# Patient Record
Sex: FEMALE | Race: WHITE | HISPANIC OR LATINO | Employment: UNEMPLOYED | URBAN - METROPOLITAN AREA
[De-identification: names, ages, dates, MRNs, and addresses within clinical notes are randomized per-mention and may not be internally consistent; named-entity substitution may affect disease eponyms.]

---

## 2023-08-12 ENCOUNTER — HOSPITAL ENCOUNTER (EMERGENCY)
Facility: HOSPITAL | Age: 43
Discharge: HOME | End: 2023-08-12
Attending: EMERGENCY MEDICINE
Payer: COMMERCIAL

## 2023-08-12 VITALS
WEIGHT: 140 LBS | TEMPERATURE: 99 F | SYSTOLIC BLOOD PRESSURE: 215 MMHG | RESPIRATION RATE: 14 BRPM | OXYGEN SATURATION: 98 % | DIASTOLIC BLOOD PRESSURE: 105 MMHG | HEART RATE: 96 BPM

## 2023-08-12 DIAGNOSIS — R03.0 ELEVATED BLOOD PRESSURE READING: ICD-10-CM

## 2023-08-12 DIAGNOSIS — N92.0 MENORRHAGIA WITH REGULAR CYCLE: Primary | ICD-10-CM

## 2023-08-12 LAB
ALBUMIN SERPL BCP-MCNC: 4 G/DL (ref 3.5–5.2)
ALP SERPL-CCNC: 89 U/L (ref 55–135)
ALT SERPL W/O P-5'-P-CCNC: 26 U/L (ref 10–44)
ANION GAP SERPL CALC-SCNC: 11 MMOL/L (ref 8–16)
AST SERPL-CCNC: 18 U/L (ref 10–40)
B-HCG UR QL: NEGATIVE
BASOPHILS # BLD AUTO: 0.06 K/UL (ref 0–0.2)
BASOPHILS NFR BLD: 0.4 % (ref 0–1.9)
BILIRUB SERPL-MCNC: 0.4 MG/DL (ref 0.1–1)
BILIRUB UR QL STRIP: NEGATIVE
BUN SERPL-MCNC: 11 MG/DL (ref 6–20)
CALCIUM SERPL-MCNC: 9.1 MG/DL (ref 8.7–10.5)
CHLORIDE SERPL-SCNC: 104 MMOL/L (ref 95–110)
CLARITY UR: CLEAR
CO2 SERPL-SCNC: 24 MMOL/L (ref 23–29)
COLOR UR: YELLOW
CREAT SERPL-MCNC: 0.7 MG/DL (ref 0.5–1.4)
CTP QC/QA: YES
DIFFERENTIAL METHOD: ABNORMAL
EOSINOPHIL # BLD AUTO: 0.1 K/UL (ref 0–0.5)
EOSINOPHIL NFR BLD: 0.4 % (ref 0–8)
ERYTHROCYTE [DISTWIDTH] IN BLOOD BY AUTOMATED COUNT: 13.1 % (ref 11.5–14.5)
EST. GFR  (NO RACE VARIABLE): >60 ML/MIN/1.73 M^2
GLUCOSE SERPL-MCNC: 105 MG/DL (ref 70–110)
GLUCOSE UR QL STRIP: NEGATIVE
HCT VFR BLD AUTO: 37.6 % (ref 37–48.5)
HGB BLD-MCNC: 12.7 G/DL (ref 12–16)
HGB UR QL STRIP: ABNORMAL
IMM GRANULOCYTES # BLD AUTO: 0.1 K/UL (ref 0–0.04)
IMM GRANULOCYTES NFR BLD AUTO: 0.6 % (ref 0–0.5)
KETONES UR QL STRIP: NEGATIVE
LEUKOCYTE ESTERASE UR QL STRIP: NEGATIVE
LYMPHOCYTES # BLD AUTO: 3 K/UL (ref 1–4.8)
LYMPHOCYTES NFR BLD: 18.1 % (ref 18–48)
MCH RBC QN AUTO: 27.7 PG (ref 27–31)
MCHC RBC AUTO-ENTMCNC: 33.8 G/DL (ref 32–36)
MCV RBC AUTO: 82 FL (ref 82–98)
MICROSCOPIC COMMENT: ABNORMAL
MONOCYTES # BLD AUTO: 1 K/UL (ref 0.3–1)
MONOCYTES NFR BLD: 6.1 % (ref 4–15)
NEUTROPHILS # BLD AUTO: 12.5 K/UL (ref 1.8–7.7)
NEUTROPHILS NFR BLD: 74.4 % (ref 38–73)
NITRITE UR QL STRIP: NEGATIVE
NRBC BLD-RTO: 0 /100 WBC
PH UR STRIP: 7 [PH] (ref 5–8)
PLATELET # BLD AUTO: 419 K/UL (ref 150–450)
PMV BLD AUTO: 10.5 FL (ref 9.2–12.9)
POTASSIUM SERPL-SCNC: 3.5 MMOL/L (ref 3.5–5.1)
PROT SERPL-MCNC: 7.7 G/DL (ref 6–8.4)
PROT UR QL STRIP: NEGATIVE
RBC # BLD AUTO: 4.58 M/UL (ref 4–5.4)
RBC #/AREA URNS HPF: >100 /HPF (ref 0–4)
SODIUM SERPL-SCNC: 139 MMOL/L (ref 136–145)
SP GR UR STRIP: 1.01 (ref 1–1.03)
URN SPEC COLLECT METH UR: ABNORMAL
UROBILINOGEN UR STRIP-ACNC: NEGATIVE EU/DL
WBC # BLD AUTO: 16.76 K/UL (ref 3.9–12.7)

## 2023-08-12 PROCEDURE — 85025 COMPLETE CBC W/AUTO DIFF WBC: CPT | Performed by: NURSE PRACTITIONER

## 2023-08-12 PROCEDURE — 81000 URINALYSIS NONAUTO W/SCOPE: CPT | Performed by: EMERGENCY MEDICINE

## 2023-08-12 PROCEDURE — 81025 URINE PREGNANCY TEST: CPT | Performed by: EMERGENCY MEDICINE

## 2023-08-12 PROCEDURE — 99283 EMERGENCY DEPT VISIT LOW MDM: CPT

## 2023-08-12 PROCEDURE — 80053 COMPREHEN METABOLIC PANEL: CPT | Performed by: NURSE PRACTITIONER

## 2023-08-12 RX ORDER — KETOROLAC TROMETHAMINE 30 MG/ML
15 INJECTION, SOLUTION INTRAMUSCULAR; INTRAVENOUS
Status: DISCONTINUED | OUTPATIENT
Start: 2023-08-12 | End: 2023-08-12 | Stop reason: HOSPADM

## 2023-08-12 RX ORDER — NAPROXEN 500 MG/1
500 TABLET ORAL 2 TIMES DAILY WITH MEALS
Qty: 28 TABLET | Refills: 0 | Status: SHIPPED | OUTPATIENT
Start: 2023-08-12 | End: 2023-08-26

## 2023-08-12 NOTE — ED PROVIDER NOTES
Encounter Date: 8/12/2023       History     Chief Complaint   Patient presents with    Abdominal Cramping     Pt is complaining of abdominal cramping that may be caused by her menstrual. Pt is also hypertensive in triage.      43-year-old female presents for heavy vaginal bleeding.  Patient says this morning she was started a period was heavier than normal so she came to the ER to be evaluated.  Says that she has been passing some clots which is atypical for her.  Complains of lower abdominal pressure.  Patient says she does not have a history of high blood pressure, drank Coke and coffee just prior to arrival.  Denies visual disturbance, chest pain, shortness of breath, extremity numbness, tingling or weakness.    The history is provided by the patient. The history is limited by a language barrier. A  was used.     Review of patient's allergies indicates:  Not on File  No past medical history on file.  No past surgical history on file.  No family history on file.     Review of Systems    Physical Exam     Initial Vitals [08/12/23 1339]   BP Pulse Resp Temp SpO2   (!) 227/110 96 14 98.6 °F (37 °C) 98 %      MAP       --         Physical Exam    Nursing note and vitals reviewed.  Constitutional: She appears well-developed and well-nourished. She is not diaphoretic. No distress.   HENT:   Head: Normocephalic and atraumatic.   Eyes: EOM are normal. Pupils are equal, round, and reactive to light.   Neck: Neck supple.   Normal range of motion.  Cardiovascular:  Normal rate.           Pulmonary/Chest: No respiratory distress.   Abdominal: Abdomen is soft. She exhibits no distension. There is no abdominal tenderness.   Musculoskeletal:         General: Normal range of motion.      Cervical back: Normal range of motion and neck supple.     Neurological: She is alert and oriented to person, place, and time.   Skin: Skin is warm and dry.   Psychiatric: She has a normal mood and affect.         ED Course    Procedures  Labs Reviewed   URINALYSIS, REFLEX TO URINE CULTURE - Abnormal; Notable for the following components:       Result Value    Occult Blood UA 3+ (*)     All other components within normal limits    Narrative:     Specimen Source->Urine   CBC W/ AUTO DIFFERENTIAL - Abnormal; Notable for the following components:    WBC 16.76 (*)     Immature Granulocytes 0.6 (*)     Gran # (ANC) 12.5 (*)     Immature Grans (Abs) 0.10 (*)     Gran % 74.4 (*)     All other components within normal limits   URINALYSIS MICROSCOPIC - Abnormal; Notable for the following components:    RBC, UA >100 (*)     All other components within normal limits    Narrative:     Specimen Source->Urine   COMPREHENSIVE METABOLIC PANEL   POCT URINE PREGNANCY          Imaging Results    None          Medications   ketorolac injection 15 mg (has no administration in time range)     Medical Decision Making:   History:   Old Records Summarized: records from clinic visits.       <> Summary of Records: Seen 6/23/22 for COVID exposure   Differential Diagnosis:   Menorrhagia, fibroids, menopause, HTN, side effect of caffeine   Clinical Tests:   Lab Tests: Ordered and Reviewed  ED Management:  43-year-old female presents for evaluation of heavy vaginal bleeding after onset of her period this morning.  Upon arrival patient was noted to be hypertensive, however this is in the setting of caffeine intake just prior to arrival.  Patient denies a history of hypertension, no signs or symptoms to indicate end-organ damage including on labs that were drawn from triage.  Says that she feels anxious due to being in the ER, but understands that she should have this rechecked by her primary care doctor to ensure she does not need antihypertensive therapy. Hemoglobin stable.  No indication for further workup or imaging from the emergency department of her vaginal bleeding, likely 2/2 menopause, abnormal bleeding or fibroids. No pain to indicate ovarian torsion, just  complains of suprapubic abdominal pressure. Patient says that she already has an OB she would like to follow up with, so did not make a referral.  Also says that she already has a primary care doctor.  Given strict return precautions and outpatient follow up.    No acute emergent medical condition has been identified. The patient appears to be low risk for an emergent medical condition is appropriate for discharge with outpatient f/u as detailed in discharge instructions for reevaluation and possible continued outpatient workup and management. I have discussed the workup with the patient, who has verbalized understanding of the plan and need for outpatient follow-up.  This evaluation does not preclude the development of an emergent condition so in addition, I have advised the patient that they can return to the ED at any time with worsening or change of their symptoms, or with any other medical complaint.                ED Course as of 08/12/23 1625   Sat Aug 12, 2023   1523 Hemoglobin: 12.7  Stable  [AT]   1523 Creatinine: 0.7  Normal [AT]   1524 Protein, UA: Negative  Normal  [AT]   1524 RBC, UA(!): >100  C/w menstrual cycle  [AT]   1524 Sodium: 139  Normal  [AT]   1524 Preg Test, Ur: Negative  Normal  [AT]   1542 BP(!): 215/105  Asymptomatic HTN, no e/o end organ damage or hypertensive emergency, no indication for emergent reduction or admission, counseled on long term risks of uncontrolled hypertension including CVA/ACS/CHF/ESRD, given return precautions, rec pcp f/u to monitor   [AT]      ED Course User Index  [AT] Alma Bartlett MD                 Clinical Impression:   Final diagnoses:  [N92.0] Menorrhagia with regular cycle (Primary)  [R03.0] Elevated blood pressure reading        ED Disposition Condition    Discharge Stable          ED Prescriptions       Medication Sig Dispense Start Date End Date Auth. Provider    naproxen (NAPROSYN) 500 MG tablet Take 1 tablet (500 mg total) by mouth 2 (two) times  daily with meals. for 14 days 28 tablet 8/12/2023 8/26/2023 Alma Bartlett MD          Follow-up Information       Follow up With Specialties Details Why Contact Info Additional Information    [facility] Emergency Dept Emergency Medicine  As needed, If symptoms worsen [address] [facility] [address] [phone]     [facility] OB/[facility]N Obstetrics and Gynecology Schedule an appointment as soon as possible for a visit in 2 days  [address] [address]  [phone]     [facility] Family Medicine Family Medicine Schedule an appointment as soon as possible for a visit in 2 days  [address], [address]  [facility] [address] [phone] Please park in [address] or [address] and use [address]. Take Medical Office Bldg. elevators.             Alma Bartlett MD  08/12/23 3867

## 2023-08-12 NOTE — DISCHARGE INSTRUCTIONS
